# Patient Record
Sex: MALE | Race: BLACK OR AFRICAN AMERICAN | Employment: UNEMPLOYED | URBAN - METROPOLITAN AREA
[De-identification: names, ages, dates, MRNs, and addresses within clinical notes are randomized per-mention and may not be internally consistent; named-entity substitution may affect disease eponyms.]

---

## 2021-05-29 ENCOUNTER — HOSPITAL ENCOUNTER (EMERGENCY)
Dept: EMERGENCY MEDICINE | Facility: HOSPITAL | Age: 36
Discharge: HOME | End: 2021-05-30
Attending: EMERGENCY MEDICINE
Payer: COMMERCIAL

## 2021-05-29 DIAGNOSIS — G89.18 ACUTE POST-OPERATIVE PAIN: ICD-10-CM

## 2021-05-29 LAB
BASOPHILS # BLD AUTO: 0 THOU/UL (ref 0–0.2)
BASOPHILS NFR BLD AUTO: 0 % (ref 0–2)
EOSINOPHIL # BLD AUTO: 0.2 THOU/UL (ref 0–0.4)
EOSINOPHIL NFR BLD AUTO: 2 % (ref 0–6)
ERYTHROCYTE [DISTWIDTH] IN BLOOD BY AUTOMATED COUNT: 12.6 % (ref 11–14.5)
HCT VFR BLD AUTO: 43.3 % (ref 40–54)
HGB BLD-MCNC: 13.7 G/DL (ref 14–18)
IMM GRANULOCYTES # BLD: 0 THOU/UL
IMM GRANULOCYTES NFR BLD: 0 %
LYMPHOCYTES # BLD AUTO: 4.2 THOU/UL (ref 0.8–4.4)
LYMPHOCYTES NFR BLD AUTO: 42 % (ref 20–40)
MCH RBC QN AUTO: 27 PG (ref 27–34)
MCHC RBC AUTO-ENTMCNC: 31.6 G/DL (ref 32–36)
MCV RBC AUTO: 85 FL (ref 80–100)
MONOCYTES # BLD AUTO: 0.7 THOU/UL (ref 0–0.9)
MONOCYTES NFR BLD AUTO: 7 % (ref 2–10)
NEUTROPHILS # BLD AUTO: 4.8 THOU/UL (ref 2–7.7)
NEUTROPHILS NFR BLD AUTO: 49 % (ref 50–70)
PLATELET # BLD AUTO: 361 THOU/UL (ref 140–440)
PMV BLD AUTO: 9.2 FL (ref 8.5–12.5)
RBC # BLD AUTO: 5.07 MILL/UL (ref 4.4–6.2)
WBC: 9.9 THOU/UL (ref 4–11)

## 2021-05-29 ASSESSMENT — MIFFLIN-ST. JEOR: SCORE: 1872.47

## 2021-05-30 LAB
ANION GAP SERPL CALCULATED.3IONS-SCNC: 12 MMOL/L (ref 5–18)
BUN SERPL-MCNC: 15 MG/DL (ref 8–22)
C REACTIVE PROTEIN LHE: 0.1 MG/DL (ref 0–0.8)
CALCIUM SERPL-MCNC: 8.7 MG/DL (ref 8.5–10.5)
CHLORIDE BLD-SCNC: 106 MMOL/L (ref 98–107)
CO2 SERPL-SCNC: 25 MMOL/L (ref 22–31)
CREAT SERPL-MCNC: 0.85 MG/DL (ref 0.7–1.3)
ERYTHROCYTE [SEDIMENTATION RATE] IN BLOOD BY WESTERGREN METHOD: 8 MM/HR (ref 0–15)
GFR SERPL CREATININE-BSD FRML MDRD: >60 ML/MIN/1.73M2
GLUCOSE BLD-MCNC: 127 MG/DL (ref 70–125)
POTASSIUM BLD-SCNC: 3.8 MMOL/L (ref 3.5–5)
SODIUM SERPL-SCNC: 143 MMOL/L (ref 136–145)

## 2021-06-26 NOTE — ED PROVIDER NOTES
EMERGENCY DEPARTMENT ENCOUNTER      FINAL IMPRESSION    1. Acute post-operative pain        MEDICAL DECISION MAKING    35-year-old gentleman presenting to the ED with paresthesias and pressure sensation to fingers after carpal tunnel surgery 5 days prior.  Nursing of vital signs reviewed are reassuring.  Examination without any evidence of infection and with mild edema and likely postoperative.  Does have some tenderness up to the left bicep concerning for an occult DVT though low suspicion.    Duplex is negative.  Lab studies without inflammatory marker elevation.  Spoke in brief with [facility] orthopedic on-call physician who believes this is likely normal postoperative symptomatology.  Plan for discharge with continued follow-up.    At the conclusion of the encounter I discussed the results of all of the tests and the disposition. All questions were answered. The patient and or family acknowledged understanding and was involved in the decision making regarding the overall care plan. I discussed the utility, limitations and findings of the exam/interventions/studies done during this visit as well as the list of differential diagnosis and symptoms for which to monitor/return to ED. Verbalizes understanding.     Due to contagious pathogen concern full protective equipment was utilized through the duration of the interaction including surgical mask and gloves.     MEDICATIONS GIVEN IN THE EMERGENCY:  Medications - No data to display    NEW PRESCRIPTIONS STARTED AT TODAY'S ER VISIT  Current Discharge Medication List      CONTINUE these medications which have NOT CHANGED    Details   HYDROcodone-acetaminophen 5-325 mg per tablet Take 1-2 tablets by mouth every 4 (four) hours as needed for pain.  Qty: 20 tablet, Refills: 0             CHIEF COMPLAINT    Chief Complaint   Patient presents with     Hand Pain     Post-op Problem       HPI    Rich II Rory is a 35 y.o. male with pertinent past medical history for  "anxiety who presents to this Emergency Department for evaluation of post op problem.     Patient had carpal tunnel on his left hand and had surgery on 04/25/21 (4 days ago) at [facility]. Patient does not recall who his surgeon was. He did not have a splint placed after surgery. Patient states that today he feels his thumb and pointer finger feel swollen. He is also having hand pain that extends to his bicep. No other swelling or redness noted. Denies fever. No other complaints at this time.      This document serves as a record of services performed by Dr. Michael Borjas. It was created on his behalf by Joan Gamboa, trained medical scribe. The creation of this record is based on the scribes personal observations and the providers statements to him/her. This document has been checked and approved by the attending provider.    RELEVANT HISTORY, MEDICATIONS, & ALLERGIES   Past medical history, surgical history, family history, medications, and allergies reviewed and pertinent noted in HPI. See end of note for comprehensive list.    REVIEW OF SYSTEMS    Constitutional:  No fever or chills, no weakness  Respiratory: No shortness of breath, no wheeze, no cough  Cardiovascular:  No chest pain  Musculoskeletal:  No new muscle/joint pain, no swelling, no loss of function. Positive for hand/bicep pain, pressure to left thumb and pointer finger.  Skin:  No rash.  Neurologic:  No headache, no focal weakness , no sensory changes    All other systems reviewed and are negative.      PHYSICAL EXAM    VITALS SIGNS: /80   Pulse 96   Temp 97.2  F (36.2  C) (Temporal)   Resp 14   Ht 5' 7\" (1.702 m)   Wt 218 lb (98.9 kg)   SpO2 98%   BMI 34.14 kg/m    Constitutional:  Awake, Alert, Cooperative.  HENT: Normocephalic, Atraumatic  Respiratory: Normal breath sounds, No respiratory distress, No wheezing, No chest tenderness.   Cardiovascular: Normal heart rate, Normal rhythm, No murmurs, No rubs, No gallops.   GI: " Soft, No tenderness, No guarding, No CVA tenderness.   Musculoskeletal: Neurovascularly intact distally, No edema, No tenderness. Left wrist in post surgical wrap. Distal CMS intact. Mild swelling to thumb, second and third digits.   Integument: Warm, Dry, No erythema, No rash.   Neurologic: Alert & oriented x 3, Normal motor function, Normal sensory function, No focal deficits noted.     LABS  Results for orders placed or performed during the hospital encounter of 05/29/21   Basic Metabolic Panel   Result Value Ref Range    Sodium 143 136 - 145 mmol/L    Potassium 3.8 3.5 - 5.0 mmol/L    Chloride 106 98 - 107 mmol/L    CO2 25 22 - 31 mmol/L    Anion Gap, Calculation 12 5 - 18 mmol/L    Glucose 127 (H) 70 - 125 mg/dL    Calcium 8.7 8.5 - 10.5 mg/dL    BUN 15 8 - 22 mg/dL    Creatinine 0.85 0.70 - 1.30 mg/dL    GFR MDRD Af Amer >60 >60 mL/min/1.73m2    GFR MDRD Non Af Amer >60 >60 mL/min/1.73m2   C-Reactive Protein   Result Value Ref Range    CRP 0.1 0.0 - 0.8 mg/dL   Sedimentation Rate   Result Value Ref Range    Sed Rate 8 0 - 15 mm/hr   HM1 (CBC with Diff)   Result Value Ref Range    WBC 9.9 4.0 - 11.0 thou/uL    RBC 5.07 4.40 - 6.20 mill/uL    Hemoglobin 13.7 (L) 14.0 - 18.0 g/dL    Hematocrit 43.3 40.0 - 54.0 %    MCV 85 80 - 100 fL    MCH 27.0 27.0 - 34.0 pg    MCHC 31.6 (L) 32.0 - 36.0 g/dL    RDW 12.6 11.0 - 14.5 %    Platelets 361 140 - 440 thou/uL    MPV 9.2 8.5 - 12.5 fL    Neutrophils % 49 (L) 50 - 70 %    Lymphocytes % 42 (H) 20 - 40 %    Monocytes % 7 2 - 10 %    Eosinophils % 2 0 - 6 %    Basophils % 0 0 - 2 %    Immature Granulocyte % 0 <=0 %    Neutrophils Absolute 4.8 2.0 - 7.7 thou/uL    Lymphocytes Absolute 4.2 0.8 - 4.4 thou/uL    Monocytes Absolute 0.7 0.0 - 0.9 thou/uL    Eosinophils Absolute 0.2 0.0 - 0.4 thou/uL    Basophils Absolute 0.0 0.0 - 0.2 thou/uL    Immature Granulocyte Absolute 0.0 <=0.0 thou/uL       EKG    None    RADIOLOGY    Please see official radiology report.  Us Venous Arm  Left    Result Date: 5/30/2021  EXAM: US VENOUS ARM LEFT LOCATION: [facility] DATE/TIME: 5/30/2021 1:35 AM INDICATION: Pain, post op carpel tunnel COMPARISON: None. TECHNIQUE: Venous Duplex ultrasound of the left upper extremity with (when possible) and without compression, augmentation, and duplex. Color flow and spectral Doppler with waveform analysis performed. FINDINGS: Ultrasound includes evaluation of the internal jugular vein, innominate vein, subclavian vein, axillary vein, and brachial vein. The superficial cephalic and basilic veins were also evaluated where seen. LEFT: No deep venous thrombosis. No superficial thrombophlebitis.     1.  No deep venous thrombosis in the left upper extremity.      All laboratories, imaging and EKG's were personally reviewed and interpreted by myself prior to disposition decision.     PROCEDURES    None    Comprehensive outline of EPIC chart Hx  PAST MEDICAL HISTORY    Past Medical History:   Diagnosis Date     Anxiety      Bipolar depression (H)      Past Surgical History:   Procedure Laterality Date     CHOLECYSTECTOMY         CURRENT MEDICATIONS    No current facility-administered medications for this encounter.     Current Outpatient Medications:      HYDROcodone-acetaminophen 5-325 mg per tablet, Take 1-2 tablets by mouth every 4 (four) hours as needed for pain., Disp: 20 tablet, Rfl: 0    ALLERGIES    No Known Allergies    FAMILY HISTORY    Family History   Problem Relation Age of Onset     Bipolar disorder Mother        SOCIAL HISTORY    Social History     Socioeconomic History     Marital status: Single     Spouse name: None     Number of children: None     Years of education: None     Highest education level: None   Occupational History     None   Social Needs     Financial resource strain: None     Food insecurity     Worry: None     Inability: None     Transportation needs     Medical: None     Non-medical: None   Tobacco Use     Smoking  status: Current Every Day Smoker     Types: Cigarettes   Substance and Sexual Activity     Alcohol use: Yes     Drug use: No     Sexual activity: None   Lifestyle     Physical activity     Days per week: None     Minutes per session: None     Stress: None   Relationships     Social connections     Talks on phone: None     Gets together: None     Attends Restorationism service: None     Active member of club or organization: None     Attends meetings of clubs or organizations: None     Relationship status: None     Intimate partner violence     Fear of current or ex partner: None     Emotionally abused: None     Physically abused: None     Forced sexual activity: None   Other Topics Concern     None   Social History Narrative     None       This document serves as a record of services performed by Dr. Michael Borjas. It was created on his behalf by Joan Gamboa, trained medical scribe. The creation of this record is based on the scribes personal observations and the providers statements to him/her.     I Dr. Michael Borjas, personally performed the services described in this documentation as scribed by the above named individual in my presence, and it is both accurate and complete.      Michael Borjas MD  05/30/21 0211

## 2021-07-06 VITALS — HEIGHT: 67 IN | WEIGHT: 218 LBS | BODY MASS INDEX: 34.21 KG/M2

## 2022-03-24 ENCOUNTER — HOSPITAL ENCOUNTER (EMERGENCY)
Facility: HOSPITAL | Age: 37
Discharge: HOME | End: 2022-03-24
Attending: STUDENT IN AN ORGANIZED HEALTH CARE EDUCATION/TRAINING PROGRAM | Admitting: STUDENT IN AN ORGANIZED HEALTH CARE EDUCATION/TRAINING PROGRAM
Payer: COMMERCIAL

## 2022-03-24 VITALS
TEMPERATURE: 97.8 F | DIASTOLIC BLOOD PRESSURE: 85 MMHG | HEART RATE: 111 BPM | RESPIRATION RATE: 16 BRPM | SYSTOLIC BLOOD PRESSURE: 139 MMHG | OXYGEN SATURATION: 98 % | BODY MASS INDEX: 34.15 KG/M2 | WEIGHT: 218.03 LBS

## 2022-03-24 DIAGNOSIS — Z20.2 EXPOSURE TO SYPHILIS: ICD-10-CM

## 2022-03-24 PROCEDURE — 96372 THER/PROPH/DIAG INJ SC/IM: CPT | Performed by: STUDENT IN AN ORGANIZED HEALTH CARE EDUCATION/TRAINING PROGRAM

## 2022-03-24 PROCEDURE — 99284 EMERGENCY DEPT VISIT MOD MDM: CPT

## 2022-03-24 PROCEDURE — 250N000011 HC RX IP 250 OP 636: Performed by: STUDENT IN AN ORGANIZED HEALTH CARE EDUCATION/TRAINING PROGRAM

## 2022-03-24 RX ADMIN — PENICILLIN G BENZATHINE 2.4 MILLION UNITS: 1200000 INJECTION, SUSPENSION INTRAMUSCULAR at 19:22

## 2022-03-24 NOTE — ED TRIAGE NOTES
"Pt states his girlfriend tested positive for an STD, \"early stages of syphilis.\"  Pt is here to get checked.  "

## 2022-03-25 NOTE — ED PROVIDER NOTES
Emergency Department Encounter         FINAL IMPRESSION:  Exposure to syphilis         ED COURSE AND MEDICAL DECISION MAKING          Patient is a healthy 36-year-old male here from home after his girlfriend tested positive for early stages of syphilis.  Patient states that his girlfriend was tested negative for gonorrhea and chlamydia as well as trichomonas however her blood test came back positive for syphilis.    Was sent here for evaluation.    Patient has no symptoms.  No fevers chills nausea vomiting.  No dysuria.  No penile discharge.  No testicular pain.  No ulceration/pain is ulcerations in his genitalia.  No abdominal pain.  No history of STDs in the past.    Long bedside discussion with patient.  I think at this point it be reasonable to treat him for syphilis.  Patient states he has a primary care doctor and can follow-up.  Explicitly told him that he need to be seen in 6 to 12 months for repeat RPR.  Patient was given 2,400,000 units of penicillin.                             At the conclusion of the encounter I discussed the results of all the tests and the disposition. The questions were answered. The patient or family acknowledged understanding and was agreeable with the care plan.                  MEDICATIONS GIVEN IN THE EMERGENCY DEPARTMENT:  Medications   penicillin G benzathine (BICILLIN L-A) injection 2.4 Million Units (has no administration in time range)       NEW PRESCRIPTIONS STARTED AT TODAY'S ED VISIT:  New Prescriptions    No medications on file       HPI     Patient is a healthy 36-year-old male here after his girlfriend tested positive for syphilis.  Here for treatment.  Has no symptoms.  No fevers chills nausea vomiting.  No penile discharge.  No  complaints.        REVIEW OF SYSTEMS:  Review of Systems   Constitutional: Negative for fever, malaise  HEENT: Negative runny nose, sore throat, ear pain, neck pain  Respiratory: Negative for shortness of breath, cough,  congestion  Cardiovascular: Negative for chest pain, leg edema  Gastrointestinal: Negative for abdominal distention, abdominal pain, constipation, vomiting, nausea, diarrhea  Genitourinary: Negative for dysuria and hematuria.   Integument: Negative for rash, skin breakdown  Neurological: Negative for paresthesias, weakness, headache.  Musculoskeletal: Negative for joint pain, joint swelling      All other systems reviewed and are negative.          MEDICAL HISTORY     Past Medical History:   Diagnosis Date     Anxiety      Bipolar depression (H)        Past Surgical History:   Procedure Laterality Date     CHOLECYSTECTOMY         Social History     Tobacco Use     Smoking status: Current Every Day Smoker     Types: Cigarettes     Smokeless tobacco: Not on file   Substance Use Topics     Alcohol use: Yes     Drug use: No       HYDROcodone-acetaminophen 5-325 mg per tablet            PHYSICAL EXAM     /85   Pulse 111   Temp 97.8  F (36.6  C) (Oral)   Resp 16   Wt 98.9 kg (218 lb 0.6 oz)   SpO2 98%   BMI 34.15 kg/m        PHYSICAL EXAM:     General: Patient appears well, nontoxic, comfortable  HEENT: Moist mucous membranes, no tongue swelling.  No head trauma.  No midline neck pain.  Cardiovascular: Normal rate, normal rhythm, no extremity edema.  No appreciable murmur.  Respiratory: No signs of respiratory distress, lungs are clear to auscultation bilaterally with no wheezes rhonchi or rales.    Musculoskeletal: Full range of motion of joints, no deformities appreciated.  Neurological: Alert and oriented, grossly neurologically intact.  Psychological: Normal affect and mood.  Integument: No rashes appreciated        RESULTS       Labs Ordered and Resulted from Time of ED Arrival to Time of ED Departure - No data to display    No orders to display                     PROCEDURES:  Procedures:  Procedures         Jm Lauren DO  Emergency Medicine  [facility] EMERGENCY DEPARTMENT      Ohl, Jm Cardoso, DO  03/26/22 [id number]